# Patient Record
Sex: MALE | Race: WHITE
[De-identification: names, ages, dates, MRNs, and addresses within clinical notes are randomized per-mention and may not be internally consistent; named-entity substitution may affect disease eponyms.]

---

## 2018-02-26 ENCOUNTER — HOSPITAL ENCOUNTER (INPATIENT)
Dept: HOSPITAL 17 - NEPC | Age: 68
LOS: 2 days | Discharge: HOME | DRG: 247 | End: 2018-02-28
Attending: HOSPITALIST | Admitting: HOSPITALIST
Payer: MEDICARE

## 2018-02-26 VITALS
OXYGEN SATURATION: 97 % | RESPIRATION RATE: 18 BRPM | SYSTOLIC BLOOD PRESSURE: 124 MMHG | DIASTOLIC BLOOD PRESSURE: 73 MMHG | HEART RATE: 60 BPM

## 2018-02-26 VITALS — HEART RATE: 64 BPM

## 2018-02-26 VITALS
OXYGEN SATURATION: 97 % | HEART RATE: 63 BPM | RESPIRATION RATE: 18 BRPM | TEMPERATURE: 98 F | DIASTOLIC BLOOD PRESSURE: 81 MMHG | SYSTOLIC BLOOD PRESSURE: 117 MMHG

## 2018-02-26 VITALS — HEART RATE: 68 BPM

## 2018-02-26 VITALS
RESPIRATION RATE: 18 BRPM | TEMPERATURE: 97.8 F | SYSTOLIC BLOOD PRESSURE: 133 MMHG | DIASTOLIC BLOOD PRESSURE: 76 MMHG | OXYGEN SATURATION: 96 % | HEART RATE: 68 BPM

## 2018-02-26 VITALS — RESPIRATION RATE: 18 BRPM | TEMPERATURE: 97.8 F | OXYGEN SATURATION: 94 % | HEART RATE: 63 BPM

## 2018-02-26 VITALS — WEIGHT: 237 LBS | BODY MASS INDEX: 33.93 KG/M2 | HEIGHT: 70 IN

## 2018-02-26 VITALS — RESPIRATION RATE: 20 BRPM

## 2018-02-26 DIAGNOSIS — E66.9: ICD-10-CM

## 2018-02-26 DIAGNOSIS — Z95.5: ICD-10-CM

## 2018-02-26 DIAGNOSIS — I25.119: ICD-10-CM

## 2018-02-26 DIAGNOSIS — Z96.651: ICD-10-CM

## 2018-02-26 DIAGNOSIS — J45.909: ICD-10-CM

## 2018-02-26 DIAGNOSIS — E78.5: ICD-10-CM

## 2018-02-26 DIAGNOSIS — J42: ICD-10-CM

## 2018-02-26 DIAGNOSIS — Z96.612: ICD-10-CM

## 2018-02-26 DIAGNOSIS — I21.4: Primary | ICD-10-CM

## 2018-02-26 DIAGNOSIS — Z87.891: ICD-10-CM

## 2018-02-26 LAB
BASOPHILS # BLD AUTO: 0 TH/MM3 (ref 0–0.2)
BASOPHILS NFR BLD: 0.2 % (ref 0–2)
BUN SERPL-MCNC: 20 MG/DL (ref 7–18)
CALCIUM SERPL-MCNC: 8.5 MG/DL (ref 8.5–10.1)
CHLORIDE SERPL-SCNC: 107 MEQ/L (ref 98–107)
CREAT SERPL-MCNC: 0.97 MG/DL (ref 0.6–1.3)
EOSINOPHIL # BLD: 0 TH/MM3 (ref 0–0.4)
EOSINOPHIL NFR BLD: 0.1 % (ref 0–4)
ERYTHROCYTE [DISTWIDTH] IN BLOOD BY AUTOMATED COUNT: 12.8 % (ref 11.6–17.2)
ERYTHROCYTE [DISTWIDTH] IN BLOOD BY AUTOMATED COUNT: 12.8 % (ref 11.6–17.2)
GFR SERPLBLD BASED ON 1.73 SQ M-ARVRAT: 77 ML/MIN (ref 89–?)
GLUCOSE SERPL-MCNC: 147 MG/DL (ref 74–106)
HCO3 BLD-SCNC: 25 MEQ/L (ref 21–32)
HCT VFR BLD CALC: 39.6 % (ref 39–51)
HCT VFR BLD CALC: 40.6 % (ref 39–51)
HGB BLD-MCNC: 13.9 GM/DL (ref 13–17)
HGB BLD-MCNC: 14.3 GM/DL (ref 13–17)
INR PPP: 1 RATIO
LYMPHOCYTES # BLD AUTO: 4.2 TH/MM3 (ref 1–4.8)
LYMPHOCYTES NFR BLD AUTO: 38.9 % (ref 9–44)
MCH RBC QN AUTO: 34 PG (ref 27–34)
MCH RBC QN AUTO: 34 PG (ref 27–34)
MCHC RBC AUTO-ENTMCNC: 35.1 % (ref 32–36)
MCHC RBC AUTO-ENTMCNC: 35.1 % (ref 32–36)
MCV RBC AUTO: 96.6 FL (ref 80–100)
MCV RBC AUTO: 97 FL (ref 80–100)
MONOCYTE #: 0.7 TH/MM3 (ref 0–0.9)
MONOCYTES NFR BLD: 6.9 % (ref 0–8)
NEUTROPHILS # BLD AUTO: 5.8 TH/MM3 (ref 1.8–7.7)
NEUTROPHILS NFR BLD AUTO: 53.9 % (ref 16–70)
PLATELET # BLD: 212 TH/MM3 (ref 150–450)
PLATELET # BLD: 217 TH/MM3 (ref 150–450)
PMV BLD AUTO: 8.2 FL (ref 7–11)
PMV BLD AUTO: 8.3 FL (ref 7–11)
PROTHROMBIN TIME: 10.1 SEC (ref 9.8–11.6)
RBC # BLD AUTO: 4.08 MIL/MM3 (ref 4.5–5.9)
RBC # BLD AUTO: 4.21 MIL/MM3 (ref 4.5–5.9)
SODIUM SERPL-SCNC: 139 MEQ/L (ref 136–145)
TROPONIN I SERPL-MCNC: 0.12 NG/ML (ref 0.02–0.05)
WBC # BLD AUTO: 10.7 TH/MM3 (ref 4–11)
WBC # BLD AUTO: 10.8 TH/MM3 (ref 4–11)

## 2018-02-26 PROCEDURE — 92928 PRQ TCAT PLMT NTRAC ST 1 LES: CPT

## 2018-02-26 PROCEDURE — 85610 PROTHROMBIN TIME: CPT

## 2018-02-26 PROCEDURE — 80061 LIPID PANEL: CPT

## 2018-02-26 PROCEDURE — 99152 MOD SED SAME PHYS/QHP 5/>YRS: CPT

## 2018-02-26 PROCEDURE — C1753 CATH, INTRAVAS ULTRASOUND: HCPCS

## 2018-02-26 PROCEDURE — 85730 THROMBOPLASTIN TIME PARTIAL: CPT

## 2018-02-26 PROCEDURE — C1769 GUIDE WIRE: HCPCS

## 2018-02-26 PROCEDURE — 85007 BL SMEAR W/DIFF WBC COUNT: CPT

## 2018-02-26 PROCEDURE — C1887 CATHETER, GUIDING: HCPCS

## 2018-02-26 PROCEDURE — C1893 INTRO/SHEATH, FIXED,NON-PEEL: HCPCS

## 2018-02-26 PROCEDURE — 92978 ENDOLUMINL IVUS OCT C 1ST: CPT

## 2018-02-26 PROCEDURE — 85025 COMPLETE CBC W/AUTO DIFF WBC: CPT

## 2018-02-26 PROCEDURE — 82550 ASSAY OF CK (CPK): CPT

## 2018-02-26 PROCEDURE — C1874 STENT, COATED/COV W/DEL SYS: HCPCS

## 2018-02-26 PROCEDURE — 85027 COMPLETE CBC AUTOMATED: CPT

## 2018-02-26 PROCEDURE — 93458 L HRT ARTERY/VENTRICLE ANGIO: CPT

## 2018-02-26 PROCEDURE — 80048 BASIC METABOLIC PNL TOTAL CA: CPT

## 2018-02-26 PROCEDURE — 85002 BLEEDING TIME TEST: CPT

## 2018-02-26 PROCEDURE — 99153 MOD SED SAME PHYS/QHP EA: CPT

## 2018-02-26 PROCEDURE — 71045 X-RAY EXAM CHEST 1 VIEW: CPT

## 2018-02-26 PROCEDURE — 93005 ELECTROCARDIOGRAM TRACING: CPT

## 2018-02-26 PROCEDURE — 80053 COMPREHEN METABOLIC PANEL: CPT

## 2018-02-26 PROCEDURE — C1725 CATH, TRANSLUMIN NON-LASER: HCPCS

## 2018-02-26 PROCEDURE — 84484 ASSAY OF TROPONIN QUANT: CPT

## 2018-02-26 RX ADMIN — Medication SCH ML: at 21:14

## 2018-02-26 NOTE — PD
HPI


Chief Complaint:  Chest Pain


Time Seen by Provider:  17:28


Travel History


International Travel<30 days:  No


Contact w/Intl Traveler<30days:  No





History of Present Illness


HPI


67-year-old male with PMH of asthma presents to the ED via EMS for evaluation 

of sudden onset 10/10 substernal chest tightness.  Patient denies accompanying 

shortness of breath, nausea vomiting, diaphoresis.  He states that his pain is 

nearly resolved on presentation.  EMS reports that they administered aspirin 

and a single dose of nitroglycerin en route.  Patient states that this pain was 

different from his normal asthma symptoms.  He states that he had a similar 

pain last week while he was home in [place].  He states that he had an EKG

, 2 sets of cardiac enzymes and a negative CTA at that time.  He is not 

currently taking any medications.  He states that he was instructed to follow-

up with a cardiologist.  He endorses history of "clean" stress test "maybe 5 

years ago."  He has a 20+ pack year smoking history, quit "about 10 years ago."





[facility]


Social History


Tobacco Use:  No (former)





Allergies-Medications


(Allergen,Severity, Reaction):  


Coded Allergies:  


     No Known Allergies (Verified  Allergy, Unknown, 2/26/18)


Reported Meds & Prescriptions





Reported Meds & Active Scripts


Active


Reported


Symbicort Inh (Budesonide/Formoterol Fumarate) 80-4.5 Mcg/Act Aero 1 Puff INH 

Q12HR


Prednisone 20 Mg Tab 20 Mg PO AS DIRECTED


     40 MG twice a day x 3 days, then 20 MG daily x 3 days, then 10 MG


     daily x 3 days








Review of Systems


Except as stated in HPI:  all other systems reviewed are Neg





Physical Exam


Narrative


GENERAL: Well-nourished, well-developed obese white male in no acute distress.


SKIN: Focused skin assessment warm/dry.


HEAD: Normocephalic.


EYES: No scleral icterus. No injection or drainage. 


NECK: Supple, trachea midline. No JVD or lymphadenopathy.


CARDIOVASCULAR: Regular rate and rhythm without murmurs, gallops, or rubs. 


CHEST: Nontender throughout without deformity or crepitus.  No retractions.


RESPIRATORY: Breath sounds equal bilaterally. No accessory muscle use.


GASTROINTESTINAL: Abdomen soft, non-tender, nondistended. 


MUSCULOSKELETAL: No cyanosis, or edema.  Homans sign negative bilaterally.


BACK: Nontender without obvious deformity. No CVA tenderness.





Data


Data


Last Documented VS





Vital Signs








  Date Time  Temp Pulse Resp B/P (MAP) Pulse Ox O2 Delivery O2 Flow Rate FiO2


 


2/26/18 17:38  60 18 124/73 (90) 97 Room Air  


 


2/26/18 17:21 97.8       








Orders





 Orders


Electrocardiogram (2/26/18 17:06)


Complete Blood Count With Diff (2/26/18 17:06)


Basic Metabolic Panel (Bmp) (2/26/18 17:06)


Ckmb (Isoenzyme) Profile (2/26/18 17:06)


Troponin I (2/26/18 17:06)


Chest, Single Ap (2/26/18 17:06)


Iv Access Insert/Monitor (2/26/18 17:06)


Ecg Monitoring (2/26/18 17:06)


Oxygen Administration (2/26/18 17:06)


Oximetry (2/26/18 17:06)


Consult Cardiology (2/26/18 )


Heparin Inj (Heparin Inj) (2/26/18 19:15)


Heparin-D5w 25,000 U/250 Ml (Heparin-D5w (2/26/18 20:30)


Act Partial Throm Time (Ptt) (2/26/18 19:12)


Prothrombin Time / Inr (Pt) (2/26/18 19:12)


Cbc No Diff, Includes Plts (2/26/18 19:12)


Cbc No Diff, Includes Plts (3/1/18 06:00)


Act Partial Throm Time (Ptt) (2/27/18 02:12)


Occult Blood (Hemoccult) Stool (2/26/18 19:12)


Admit Order (Ed Use Only) (2/26/18 19:25)





Labs





Laboratory Tests








Test


  2/26/18


17:30


 


White Blood Count 10.7 TH/MM3 


 


Red Blood Count 4.21 MIL/MM3 


 


Hemoglobin 14.3 GM/DL 


 


Hematocrit 40.6 % 


 


Mean Corpuscular Volume 96.6 FL 


 


Mean Corpuscular Hemoglobin 34.0 PG 


 


Mean Corpuscular Hemoglobin


Concent 35.1 % 


 


 


Red Cell Distribution Width 12.8 % 


 


Platelet Count 217 TH/MM3 


 


Mean Platelet Volume 8.2 FL 


 


Neutrophils (%) (Auto) 53.9 % 


 


Lymphocytes (%) (Auto) 38.9 % 


 


Monocytes (%) (Auto) 6.9 % 


 


Eosinophils (%) (Auto) 0.1 % 


 


Basophils (%) (Auto) 0.2 % 


 


Neutrophils # (Auto) 5.8 TH/MM3 


 


Lymphocytes # (Auto) 4.2 TH/MM3 


 


Monocytes # (Auto) 0.7 TH/MM3 


 


Eosinophils # (Auto) 0.0 TH/MM3 


 


Basophils # (Auto) 0.0 TH/MM3 


 


CBC Comment DIFF FINAL 


 


Differential Comment  


 


Blood Urea Nitrogen 20 MG/DL 


 


Creatinine 0.97 MG/DL 


 


Random Glucose 147 MG/DL 


 


Calcium Level 8.5 MG/DL 


 


Sodium Level 139 MEQ/L 


 


Potassium Level 3.9 MEQ/L 


 


Chloride Level 107 MEQ/L 


 


Carbon Dioxide Level 25.0 MEQ/L 


 


Anion Gap 7 MEQ/L 


 


Estimat Glomerular Filtration


Rate 77 ML/MIN 


 


 


Total Creatine Kinase 80 U/L 


 


Troponin I 0.12 NG/ML 











MDM


Medical Decision Making


Medical Screen Exam Complete:  Yes


Emergency Medical Condition:  Yes


Differential Diagnosis


Chest pain versus unstable angina versus ACS versus other


Narrative Course


67-year-old male with PMH of asthma presents to the ED via EMS for evaluation 

of sudden onset 10/10 substernal chest tightness.  Patient denies accompanying 

shortness of breath, nausea vomiting, diaphoresis.    The patient states that 

he had a similar pain last week while he was home in [place].  He states 

that he had an EKG, 2 sets of cardiac enzymes and a negative CTA at that time.  

He is not currently taking any medications.  He states that he was instructed 

to follow-up with a cardiologist.  He endorses history of "clean" stress test 

"maybe 5 years ago."  He has a 20+ pack year smoking history, quit "about 10 

years ago."  EMS administered several doses of nitroglycerin and ASA en route.  

Vitals reviewed.  On exam this is an obese, nontoxic-appearing white male in no 

acute distress.  No appreciable M/R/G.  Chest CTAB.  No lower extremity edema.  

IV was established.





EKG rate 66, sinus rhythm.  IL interval 147, QRS 78,  ms.  Normal axis.  

0.05 mV ST depressions in V3 through V6.  Reviewed by Dr. Tong.


CXR: Mildly underinflated exam.  No acute pulmonary pulmonary abnormality 

identified.


Troponin: 0.12





2/26/18 17:30





Calcium Level 8.5





Heparin bolus and drip initiated.  I discussed the workup with the patient as 

well as the need for thorough cardiac evaluation and admission.  He is 

agreeable to the plan.  Cardiology consult placed.  I spoke with Dr. Medina 

who agrees to accept the patient to the medicine service.  Please see medicine 

notes for disposition.











Génesis Gomez Feb 26, 2018 17:31

## 2018-02-26 NOTE — HHI.HP
__________________________________________________





[facility]


Service


[facility]


Primary Care Physician


Unknown


Admission Diagnosis





NSTEMI


Diagnoses:  


Travel History


International Travel<30 Days:  No


Contact w/Intl Traveler <30 Da:  No


Traveled to Known Affected Are:  No


History of Present Illness


67-year-old male with past medical history significant for asthma, CAD and 

hyperlipidemia presents to the emergency department for evaluation of chest 

pain.  Per the patient, his chest pain started around 4 PM and was 10/10 

substernal and nonradiating.  He describes the pain as a pressure with 

accompanying shortness of breath.  He denies any diaphoresis.  No fever/chills.

  No nausea/vomiting/diarrhea.  The patient had similar symptoms [place] he is from a week ago where cardiac workup was negative.  He was 

instructed to follow-up with the cardiologist which she has not yet been able 

to do.  He is in [facility] on vacation.





Review of Systems


Except as stated in HPI:  all other systems reviewed are Neg





Past Family Social History


Past Medical History


Assessment


CAD


Hyperlipidemia


Past Surgical History


Stent placement 1


Right knee replacement


Left shoulder replacement


Reported Medications





Reported Meds & Active Scripts


Active


Reported


Symbicort Inh (Budesonide/Formoterol Fumarate) 80-4.5 Mcg/Act Aero 1 Puff INH 

Q12HR


Prednisone 20 Mg Tab 20 Mg PO AS DIRECTED


     40 MG twice a day x 3 days, then 20 MG daily x 3 days, then 10 MG


     daily x 3 days


Allergies:  


Coded Allergies:  


     No Known Allergies (Verified  Allergy, Unknown, 18)


Family History


Patient was adopted


Social History


Quit smoking 8 years ago.  Occasional alcohol.  Denies illicit drugs.





Physical Exam


Vital Signs





Vital Signs








  Date Time  Temp Pulse Resp B/P (MAP) Pulse Ox O2 Delivery O2 Flow Rate FiO2


 


18 21:25 98.0 63 18 117/81 (93) 97 Room Air  


 


18 21:22   20     


 


18 21:21      Room Air  


 


18 17:38  60 18 124/73 (90) 97 Room Air  


 


18 17:33  69 18  97 Room Air  


 


18 17:21 97.8 63 18  94   








Physical Exam


GENERAL:  male sitting up in bed


SKIN: No rashes, ecchymoses or lesions. Cool and dry.


HEAD: Atraumatic. Normocephalic. No temporal or scalp tenderness.


EYES: Pupils equal round and reactive. Extraocular motions intact. No scleral 

icterus. No injection or drainage. 


ENT: Nose without bleeding, purulent drainage or septal hematoma. Throat 

without erythema, tonsillar hypertrophy or exudate. Uvula midline. Airway 

patent.


NECK: Trachea midline. No JVD or lymphadenopathy. Supple, nontender, no 

meningeal signs.


CARDIOVASCULAR: Regular rate and rhythm without murmurs, gallops, or rubs. 


RESPIRATORY: Clear to auscultation. Breath sounds equal bilaterally. No wheezes

, rales, or rhonchi.  


GASTROINTESTINAL: Abdomen soft, non-tender, nondistended. No hepato-splenomegaly

, or palpable masses. No guarding.


MUSCULOSKELETAL: Extremities without clubbing, cyanosis, or edema. No joint 

tenderness, effusion, or edema noted. No calf tenderness. 


NEUROLOGICAL: Awake and alert. Cranial nerves II through XII intact.  Motor and 

sensory grossly within normal limits. Normal speech.


Laboratory





Laboratory Tests








Test


  18


17:30 18


20:20


 


White Blood Count 10.7  10.8 


 


Red Blood Count 4.21  4.08 


 


Hemoglobin 14.3  13.9 


 


Hematocrit 40.6  39.6 


 


Mean Corpuscular Volume 96.6  97.0 


 


Mean Corpuscular Hemoglobin 34.0  34.0 


 


Mean Corpuscular Hemoglobin


Concent 35.1 


  35.1 


 


 


Red Cell Distribution Width 12.8  12.8 


 


Platelet Count 217  212 


 


Mean Platelet Volume 8.2  8.3 


 


Neutrophils (%) (Auto) 53.9  


 


Lymphocytes (%) (Auto) 38.9  


 


Monocytes (%) (Auto) 6.9  


 


Eosinophils (%) (Auto) 0.1  


 


Basophils (%) (Auto) 0.2  


 


Neutrophils # (Auto) 5.8  


 


Lymphocytes # (Auto) 4.2  


 


Monocytes # (Auto) 0.7  


 


Eosinophils # (Auto) 0.0  


 


Basophils # (Auto) 0.0  


 


CBC Comment DIFF FINAL  


 


Differential Comment   


 


Blood Urea Nitrogen 20  


 


Creatinine 0.97  


 


Random Glucose 147  


 


Calcium Level 8.5  


 


Sodium Level 139  


 


Potassium Level 3.9  


 


Chloride Level 107  


 


Carbon Dioxide Level 25.0  


 


Anion Gap 7  


 


Estimat Glomerular Filtration


Rate 77 


  


 


 


Total Creatine Kinase 80  


 


Troponin I 0.12  


 


Prothrombin Time  10.1 


 


Prothromb Time International


Ratio 


  1.0 


 


 


Activated Partial


Thromboplast Time 


  23.4 


 








Result Diagram:  


18 [id number]








Caprini VTE Risk Assessment


Caprini VTE Risk Assessment:  Mod/High Risk (score >= 2)


Caprini Risk Assessment Model











 Point Value = 1          Point Value = 2  Point Value = 3  Point Value = 5


 


Age 41-60


Minor surgery


BMI > 25 kg/m2


Swollen legs


Varicose veins


Pregnancy or postpartum


History of unexplained or recurrent


   spontaneous 


Oral contraceptives or hormone


   replacement


Sepsis (< 1 month)


Serious lung disease, including


   pneumonia (< 1 month)


Abnormal pulmonary function


Acute myocardial infarction


Congestive heart failure (< 1 month)


History of inflammatory bowel disease


Medical patient at bed rest Age 61-74


Arthroscopic surgery


Major open surgery (> 45 min)


Laparoscopic surgery (> 45 min)


Malignancy


Confined to bed (> 72 hours)


Immobilizing plaster cast


Central venous access Age >= 75


History of VTE


Family history of VTE


Factor V Leiden


Prothrombin 11574F


Lupus anticoagulant


Anticardiolipin antibodies


Elevated serum homocysteine


Heparin-induced thrombocytopenia


Other congenital or acquired


   thrombophilia Stroke (< 1 month)


Elective arthroplasty


Hip, pelvis, or leg fracture


Acute spinal cord injury (< 1 month)








Prophylaxis Regimen











   Total Risk


Factor Score Risk Level Prophylaxis Regimen


 


0-1      Low Early ambulation


 


2 Moderate Order ONE of the following:


*Sequential Compression Device (SCD)


*Heparin 5000 units SQ BID


 


3-4 Higher Order ONE of the following medications:


*Heparin 5000 units SQ TID


*Enoxaparin/Lovenox 40 mg SQ daily (WT < 150 kg, CrCl > 30 mL/min)


*Enoxaparin/Lovenox 30 mg SQ daily (WT < 150 kg, CrCl > 10-29 mL/min)


*Enoxaparin/Lovenox 30 mg SQ BID (WT < 150 kg, CrCl > 30 mL/min)


AND/OR


*Sequential Compression Device (SCD)


 


5 or more Highest Order ONE of the following medications:


*Heparin 5000 units SQ TID (Preferred with Epidurals)


*Enoxaparin/Lovenox 40 mg SQ daily (WT < 150 kg, CrCl > 30 mL/min)


*Enoxaparin/Lovenox 30 mg SQ daily (WT < 150 kg, CrCl > 10-29 mL/min)


*Enoxaparin/Lovenox 30 mg SQ BID (WT < 150 kg, CrCl > 30 mL/min)


AND


*Sequential Compression Device (SCD)











Assessment and Plan


Assessment and Plan


Assessment/plan:





1.  NSTEMI


Initial troponin 0.12


EKG significant for ST depression in V4-V6, personally reviewed


Heparin drip


Serial troponins/EKGs


Cardiology consulted, appreciate recommendations





2.  Asthma/hyperlipidemia


Continue home medications





FEN


NPO


Electrolytes: Monitor and replete when necessary


Heparin drip





Physician Certification


2 Midnight Certification Type:  Admission for Inpatient Services


Order for Inpatient Services


The services are ordered in accordance with Medicare regulations or non-

Medicare payer requirements, as applicable.  In the case of services not 

specified as inpatient-only, they are appropriately provided as inpatient 

services in accordance with the 2-midnight benchmark.


Estimated LOS (days):  2


2 days is the estimated time the patient will need to remain in the hospital, 

assuming treatment plan goals are met and no additional complications.


Post-Hospital Plan:  Not yet determined











Gale Medina MD 2018 22:02

## 2018-02-26 NOTE — RADRPT
EXAM DATE/TIME:  02/26/2018 17:25 

 

[facility] COMPARISON:     

No previous studies available for comparison.

 

                     

INDICATIONS :     

Chest pain.

                     

 

MEDICAL HISTORY :     

None.          

 

SURGICAL HISTORY :     

None.   

 

ENCOUNTER:     

Initial                                        

 

ACUITY:     

1 day      

 

PAIN SCORE:     

10/10

 

LOCATION:      

middle chest.

 

FINDINGS:     

Portable AP view of the chest demonstrates a normal-sized cardiac silhouette. No effusion, consolidat
ion, or pneumothorax is visualized. The bones and soft tissues demonstrate no acute abnormality. Lung
s are underinflated.

 

CONCLUSION:     

Mildly underinflated examination. No acute cardiopulmonary abnormality is identified.

 

 

 

 Andrae Henry MD on February 26, 2018 at 17:33           

Board Certified Radiologist.

 This report was verified electronically.

## 2018-02-26 NOTE — PD
Physical Exam


Date Seen by Provider:  Feb 26, 2018


Time Seen by Provider:  18:30


Narrative


I am seeing this patient with Ana Maria Gomez PA-C.  This is a 67-year-old 

male with history coronary artery disease, chronic bronchitis, who presents 

here after having an episode of sudden onset of 10 out of 10 chest pain.  

Patient states that he was [place] from the beach when he started 

experiencing the pain.  He denies any nausea or diaphoresis.  He states he had 

similar pain when he was back in [place].  He states at that time he went 

to the hospital they did 2 sets of cardiac enzymes which were reported normal 

and a CT angiogram to rule out PE.  He reports that he did not find anything at 

that time.  The patient states that he has had previous stent placement.  He 

states he has not had a stress test in at least 2 years.





Data


Data


Last Documented VS





Vital Signs








  Date Time  Temp Pulse Resp B/P (MAP) Pulse Ox O2 Delivery O2 Flow Rate FiO2


 


2/26/18 17:38  60 18 124/73 (90) 97 Room Air  


 


2/26/18 17:21 97.8       








Orders





 Orders


Electrocardiogram (2/26/18 17:06)


Complete Blood Count With Diff (2/26/18 17:06)


Basic Metabolic Panel (Bmp) (2/26/18 17:06)


Ckmb (Isoenzyme) Profile (2/26/18 17:06)


Troponin I (2/26/18 17:06)


Chest, Single Ap (2/26/18 17:06)


Iv Access Insert/Monitor (2/26/18 17:06)


Ecg Monitoring (2/26/18 17:06)


Oxygen Administration (2/26/18 17:06)


Oximetry (2/26/18 17:06)


Consult Cardiology (2/26/18 )


Heparin Inj (Heparin Inj) (2/26/18 19:15)


Heparin-D5w 25,000 U/250 Ml (Heparin-D5w (2/26/18 19:15)


Act Partial Throm Time (Ptt) (2/26/18 19:12)


Prothrombin Time / Inr (Pt) (2/26/18 19:12)


Cbc No Diff, Includes Plts (2/26/18 19:12)


Cbc No Diff, Includes Plts (3/1/18 06:00)


Act Partial Throm Time (Ptt) (2/27/18 02:12)


Occult Blood (Hemoccult) Stool (2/26/18 19:12)


Admit Order (Ed Use Only) (2/26/18 19:25)





Labs





Laboratory Tests








Test


  2/26/18


17:30


 


White Blood Count 10.7 TH/MM3 


 


Red Blood Count 4.21 MIL/MM3 


 


Hemoglobin 14.3 GM/DL 


 


Hematocrit 40.6 % 


 


Mean Corpuscular Volume 96.6 FL 


 


Mean Corpuscular Hemoglobin 34.0 PG 


 


Mean Corpuscular Hemoglobin


Concent 35.1 % 


 


 


Red Cell Distribution Width 12.8 % 


 


Platelet Count 217 TH/MM3 


 


Mean Platelet Volume 8.2 FL 


 


Neutrophils (%) (Auto) 53.9 % 


 


Lymphocytes (%) (Auto) 38.9 % 


 


Monocytes (%) (Auto) 6.9 % 


 


Eosinophils (%) (Auto) 0.1 % 


 


Basophils (%) (Auto) 0.2 % 


 


Neutrophils # (Auto) 5.8 TH/MM3 


 


Lymphocytes # (Auto) 4.2 TH/MM3 


 


Monocytes # (Auto) 0.7 TH/MM3 


 


Eosinophils # (Auto) 0.0 TH/MM3 


 


Basophils # (Auto) 0.0 TH/MM3 


 


CBC Comment DIFF FINAL 


 


Differential Comment  


 


Blood Urea Nitrogen 20 MG/DL 


 


Creatinine 0.97 MG/DL 


 


Random Glucose 147 MG/DL 


 


Calcium Level 8.5 MG/DL 


 


Sodium Level 139 MEQ/L 


 


Potassium Level 3.9 MEQ/L 


 


Chloride Level 107 MEQ/L 


 


Carbon Dioxide Level 25.0 MEQ/L 


 


Anion Gap 7 MEQ/L 


 


Estimat Glomerular Filtration


Rate 77 ML/MIN 


 


 


Total Creatine Kinase 80 U/L 


 


Troponin I 0.12 NG/ML 











[facility]


Medical Record Reviewed:  Yes


Supervised Visit with DOMO:  Yes


Differential Diagnosis


ACS versus GERD versus pulmonary embolus versus musculoskeletal pain.


Narrative Course


67-year-old male with history of coronary artery disease, chronic bronchitis, 

presents here after having an episode of 10 out of 10 chest pain.  EKG does not 

show any acute ST elevation or depression.  Patient's cardiac enzymes are 

elevated.  The patient will be admitted to the hospital as a non-STEMI.  He was 

started on a heparin drip.  He will have an inch of nitroglycerin paste on his 

anterior chest wall.  He will be admitted to the hospitalist service.


Diagnosis





 Primary Impression:  


 Non-ST elevation MI (NSTEMI)


 Additional Impression:  


 History of coronary artery disease





Admitting Information


Admitting Physician Requests:  Admit











Alfredo Tong MD Feb 26, 2018 19:45

## 2018-02-27 VITALS — HEART RATE: 64 BPM

## 2018-02-27 VITALS
SYSTOLIC BLOOD PRESSURE: 127 MMHG | TEMPERATURE: 97.6 F | OXYGEN SATURATION: 96 % | RESPIRATION RATE: 18 BRPM | DIASTOLIC BLOOD PRESSURE: 8 MMHG | HEART RATE: 55 BPM

## 2018-02-27 VITALS
TEMPERATURE: 97.3 F | SYSTOLIC BLOOD PRESSURE: 111 MMHG | RESPIRATION RATE: 16 BRPM | OXYGEN SATURATION: 95 % | DIASTOLIC BLOOD PRESSURE: 60 MMHG | HEART RATE: 67 BPM

## 2018-02-27 VITALS
RESPIRATION RATE: 20 BRPM | DIASTOLIC BLOOD PRESSURE: 81 MMHG | TEMPERATURE: 97.7 F | OXYGEN SATURATION: 97 % | SYSTOLIC BLOOD PRESSURE: 134 MMHG | HEART RATE: 64 BPM

## 2018-02-27 VITALS — HEART RATE: 62 BPM

## 2018-02-27 VITALS
TEMPERATURE: 97.3 F | HEART RATE: 88 BPM | OXYGEN SATURATION: 97 % | SYSTOLIC BLOOD PRESSURE: 119 MMHG | RESPIRATION RATE: 19 BRPM | DIASTOLIC BLOOD PRESSURE: 77 MMHG

## 2018-02-27 VITALS
SYSTOLIC BLOOD PRESSURE: 106 MMHG | HEART RATE: 65 BPM | DIASTOLIC BLOOD PRESSURE: 59 MMHG | RESPIRATION RATE: 18 BRPM | OXYGEN SATURATION: 95 % | TEMPERATURE: 97.7 F

## 2018-02-27 VITALS — SYSTOLIC BLOOD PRESSURE: 132 MMHG | HEART RATE: 58 BPM | DIASTOLIC BLOOD PRESSURE: 75 MMHG | OXYGEN SATURATION: 96 %

## 2018-02-27 VITALS — HEART RATE: 56 BPM

## 2018-02-27 VITALS — HEART RATE: 60 BPM

## 2018-02-27 VITALS — HEART RATE: 66 BPM

## 2018-02-27 VITALS — HEART RATE: 58 BPM

## 2018-02-27 LAB
ALBUMIN SERPL-MCNC: 3.1 GM/DL (ref 3.4–5)
ALP SERPL-CCNC: 63 U/L (ref 45–117)
ALT SERPL-CCNC: 34 U/L (ref 12–78)
AST SERPL-CCNC: 19 U/L (ref 15–37)
BASOPHILS # BLD AUTO: 0.1 TH/MM3 (ref 0–0.2)
BASOPHILS NFR BLD: 0.4 % (ref 0–2)
BILIRUB SERPL-MCNC: 0.4 MG/DL (ref 0.2–1)
BUN SERPL-MCNC: 17 MG/DL (ref 7–18)
CALCIUM SERPL-MCNC: 8.3 MG/DL (ref 8.5–10.1)
CHLORIDE SERPL-SCNC: 107 MEQ/L (ref 98–107)
CREAT SERPL-MCNC: 0.79 MG/DL (ref 0.6–1.3)
EOSINOPHIL # BLD: 0.2 TH/MM3 (ref 0–0.4)
EOSINOPHIL NFR BLD: 1.5 % (ref 0–4)
ERYTHROCYTE [DISTWIDTH] IN BLOOD BY AUTOMATED COUNT: 12.6 % (ref 11.6–17.2)
GFR SERPLBLD BASED ON 1.73 SQ M-ARVRAT: 98 ML/MIN (ref 89–?)
GLUCOSE SERPL-MCNC: 89 MG/DL (ref 74–106)
HCO3 BLD-SCNC: 28.5 MEQ/L (ref 21–32)
HCT VFR BLD CALC: 39.8 % (ref 39–51)
HGB BLD-MCNC: 13.9 GM/DL (ref 13–17)
LYMPHOCYTES # BLD AUTO: 6.3 TH/MM3 (ref 1–4.8)
LYMPHOCYTES NFR BLD AUTO: 51.9 % (ref 9–44)
LYMPHOCYTES: 45 % (ref 9–44)
MCH RBC QN AUTO: 33.7 PG (ref 27–34)
MCHC RBC AUTO-ENTMCNC: 34.9 % (ref 32–36)
MCV RBC AUTO: 96.7 FL (ref 80–100)
MONOCYTE #: 0.8 TH/MM3 (ref 0–0.9)
MONOCYTES NFR BLD: 6.8 % (ref 0–8)
MONOCYTES: 4 % (ref 0–8)
NEUTROPHILS # BLD AUTO: 4.8 TH/MM3 (ref 1.8–7.7)
NEUTROPHILS NFR BLD AUTO: 39.4 % (ref 16–70)
NEUTS BAND # BLD MANUAL: 6.1 TH/MM3 (ref 1.8–7.7)
NEUTS BAND NFR BLD: 1 % (ref 0–6)
NEUTS SEG NFR BLD MANUAL: 49 % (ref 16–70)
PLATELET # BLD: 195 TH/MM3 (ref 150–450)
PMV BLD AUTO: 8 FL (ref 7–11)
PROT SERPL-MCNC: 6.2 GM/DL (ref 6.4–8.2)
RBC # BLD AUTO: 4.12 MIL/MM3 (ref 4.5–5.9)
SODIUM SERPL-SCNC: 141 MEQ/L (ref 136–145)
TROPONIN I SERPL-MCNC: 0.36 NG/ML (ref 0.02–0.05)
WBC # BLD AUTO: 12.1 TH/MM3 (ref 4–11)

## 2018-02-27 PROCEDURE — B2111ZZ FLUOROSCOPY OF MULTIPLE CORONARY ARTERIES USING LOW OSMOLAR CONTRAST: ICD-10-PCS | Performed by: INTERNAL MEDICINE

## 2018-02-27 PROCEDURE — B2151ZZ FLUOROSCOPY OF LEFT HEART USING LOW OSMOLAR CONTRAST: ICD-10-PCS | Performed by: INTERNAL MEDICINE

## 2018-02-27 PROCEDURE — 027034Z DILATION OF CORONARY ARTERY, ONE ARTERY WITH DRUG-ELUTING INTRALUMINAL DEVICE, PERCUTANEOUS APPROACH: ICD-10-PCS | Performed by: INTERNAL MEDICINE

## 2018-02-27 PROCEDURE — 4A023N7 MEASUREMENT OF CARDIAC SAMPLING AND PRESSURE, LEFT HEART, PERCUTANEOUS APPROACH: ICD-10-PCS | Performed by: INTERNAL MEDICINE

## 2018-02-27 RX ADMIN — BUDESONIDE AND FORMOTEROL FUMARATE DIHYDRATE SCH PUFF: 80; 4.5 AEROSOL RESPIRATORY (INHALATION) at 09:00

## 2018-02-27 RX ADMIN — TICAGRELOR SCH MG: 90 TABLET ORAL at 20:43

## 2018-02-27 RX ADMIN — BUDESONIDE AND FORMOTEROL FUMARATE DIHYDRATE SCH PUFF: 80; 4.5 AEROSOL RESPIRATORY (INHALATION) at 20:45

## 2018-02-27 RX ADMIN — DILTIAZEM HYDROCHLORIDE SCH MG: 30 TABLET, FILM COATED ORAL at 12:00

## 2018-02-27 RX ADMIN — Medication SCH ML: at 09:00

## 2018-02-27 RX ADMIN — DILTIAZEM HYDROCHLORIDE SCH MG: 30 TABLET, FILM COATED ORAL at 17:53

## 2018-02-27 RX ADMIN — Medication SCH ML: at 20:44

## 2018-02-27 NOTE — MB
cc:

Humza Gutiérrez MD

****

 

 

DATE OF CONSULT:

 

REASON FOR CONSULTATION:

Evaluation of chest pain.

 

HISTORY OF PRESENT ILLNESS:

Juan Daniel Ghosh is a 67-year-old man with known coronary artery 

disease.  He had a stent 3-4 years ago.  He followed with cardiologist

for one year and then nothing else after that.  He is not on statin 

therapy and he has not been on heart medications.  He started having 

chest discomfort on Saturday, intermittent episodes lasting 30 minutes

at a time, severe episode yesterday which made him come to the ER.  

His troponins are now positive.  He quit smoking 8 years ago.  He has 

hyperlipidemia but on no medications for it.  Denies hypertension or 

diabetes. Family history is unknown because he is adopted.  When asked

why he has not received cardiology followup, he seems somewhat 

nonchalant about that.  He apparently does have a regular doctor in 

[place] that he does see but he seemingly has not availed himself

of any cardiac prevention therapy with statins, etc.  Some of reported

medications are his inhaler and now prednisone.

 

PAST MEDICAL HISTORY: Includes:

   1. Coronary artery disease.

   2. Hyperlipidemia.

   3. Previous coronary stent.

   4. Right knee replacement.

   5. Left shoulder replacement.

 

SOCIAL HISTORY:

He is adopted.  He is  with a child.  Here on vacation from 

[place].  Quit smoking 8 years ago.  Occasional alcohol only.

 

ALLERGIES:

NONE KNOWN.

 

PHYSICAL EXAMINATION:

GENERAL:  Reveals an obese, pleasant white male, in no acute distress.

 

VITAL SIGNS:  Charted.

HEENT:  Unremarkable.

NECK:  There is no JVD.  There are no bruits.

CHEST:  Shows some mildly reduced breath sounds and I hear a few 

scattered expiratory wheezes.

CARDIAC:  S1, S2.  Regular rate and rhythm.  No murmurs or gallops.   

 

ABDOMEN:  Soft, nontender.

EXTREMITIES:  No clubbing, cyanosis or edema.  His pulses are intact. 

 

LABORATORY DATA:

His creatinine is 0.79.  Troponins come from 0.12 up to a peak of 0.36

this morning.

 

IMAGING STUDIES:

His chest x-ray shows no acute disease.

 

IMPRESSION:

A 67-year-old male with known coronary artery disease, who just was 

stented 3-4 years ago and has not had a cardiology followup, now 

presents with a new acute non-ST segment elevation myocardial 

infarction.

 

PLAN:

The patient has been started on aspirin, nitrates and heparin.  I am 

going to go ahead with a cardiac catheterization this morning.  

Informed consent has been obtained. No beta-blocker currently because 

of the asthma, may consider diltiazem after his cath.  Further therapy

to be determined.

 

 

 

__________________________________

MD GHAZALA Pelaez/TL/rr

D: 02/27/2018, 07:45 AM

T: 02/27/2018, 09:45 AM

Visit #: [id number]

Job #: [id number]

## 2018-02-27 NOTE — EKG
Date Performed: 02/26/2018       Time Performed: 23:29:54

 

PTAGE:      67 years

 

EKG:      Sinus rhythm 

 

 Inferior/lateral ST-T changes are nonspecific Low QRS voltages in limb leads Borderline ECG Since th
e prior tracing, there has been no significant change

 

DOCTOR:   Neil Valadez  Interpretating Date/Time  02/27/2018 22:15:19

## 2018-02-27 NOTE — MA
cc:

Humza Gutiérrez MD

****

 

 

02/27/2018

 

PROCEDURE PERFORMED:

Left heart catheterization, left ventriculography, coronary 

angiography, balloon angioplasty, stenting and intravascular 

ultrasound of the proximal left circumflex coronary artery.

 

BRIEF HISTORY:

Juan Daniel Ghosh is a 67-year-old man who had a stent a few years ago.  

He has not been followed by cardiology.  He has not been on statin 

even though there is a suggestion of a hyperlipidemia.  He comes in 

with intermittent chest pain since Saturday and elevated troponin and 

ST changes consistent with an acute non-ST segment elevation 

myocardial infarction.

 

DESCRIPTION OF THE PROCEDURE:

The patient was brought to the cardiac cath lab in a fasting state 

with a heparin drip running.  The right wrist was prepped and draped 

in a sterile fashion.  Using a Jelco needle, a Terumo sheath was 

placed without difficulty.  Coronary angiography was then performed 

using a Tiger catheter.  The circumflex artery had critical disease.  

I exchanged over a wire to an XB 3.5 left guiding catheter.  I was 

able to engage the left main nicely.  The circumflex artery was then 

crossed with an ATW wire and an angiogram demonstrated the proximal 

circumflex lesion was long.  I placed the wire distally into the 

enlarged posterolateral branch.  I then did a balloon angioplasty of 

the critical area in the circumflex which was just before the marginal

branch.  I then took an angiogram.  I gave consideration to placing a 

new stent end-to-end with the old stent all the way up to the proximal

vessel versus leaving a gap between the stents.  I chose to go ahead 

and overlap meaning I jailed the major marginal branch.  I chose a 3.5

x 38 mm Resolute Nick stent and deployed this at 12 atmospheres.  

There was good symmetrical expansion of the stent.  The stent balloon 

was then withdrawn.  Angiography demonstrated the proximal vessel 

needed post-dilation.  I did IVUS which confirmed to me it was 

formidable vessel.  I then used a 4.0 x 20 mm noncompliant balloon 

proximally inside the stent and inflated the stent to 20 atmospheres 

which was a 4.24 mm diameter.  Angiography now demonstrates an 

excellent result in the circumflex proper.  Intracoronary  

nitroglycerin was given as well.  There was some narrowing of the 

major obtuse marginal branch, but not severe enough to require 

revascularization.  The catheter was then exchanged over a wire to an 

angled pigtail catheter whereupon left ventriculography and then a 

pullback were performed.

 

At the end of the procedure, the sheath is being removed from the 

right wrist with a Terumo band placed.  The patient is being loaded 

with Brilinta and being transferred out of the cath lab in stable 

condition.

 

FINDINGS:

 

HEMODYNAMICS: Left  ventricular pressure was 119/0 with an end 

diastolic pressure of 33.  Aortic pressure is 124/71 with a mean of 

94.  There was no gradient during pullback from the left ventricle to 

the aorta.

 

LEFT VENTRICULOGRAPHY:  Left ventriculography in the GOODEN projection 

appears normal with an estimated EF of 50%.

 

CORONARY ANGIOGRAPHY:

   1.  Left main coronary artery is short and normal-appearing.  It 

      bifurcates into a large LAD and large circumflex vessel.

   2. The LAD gives off the first diagonal branch and a few 

      millimeters after that the first septal  branch and 

      then continuation of the LAD.  The LAD between the septal and 

      diagonal has about 40% stenosis.  The diagonal branch has about 

      a 70% mid stenosis where it bifurcates.  The remainder of the 

      LAD has irregularities only.

   3. The circumflex artery has a long stenosis beginning proximally 

      with a 99% stenosis just before the major obtuse marginal branch

      and before the previous stent that was in the mid circumflex 

      vessel.

   4. The posterolateral branch has about 30% disease and is a large 

      vessel.

   5. The obtuse marginal branch is medium sized with 50% ostial and 

      proximal disease.

   6. The right coronary artery is dominant with 50%-60% proximal and 

      mid disease.

 

RESULTS OF STENTING:

Finally stenting of the circumflex artery proper, a 0% residual 

stenosis had been achieved throughout the circumflex.  The major 

obtuse marginal branch has at least 50% ostial disease and proximal 

disease, but was not intervened upon.

 

PLAN:

The patient is loaded with Brilinta and will continue this for 30 days

with a free coupon and hopefully continue this medication long-term.  

If Brilinta needs to be interrupted or switched to Plavix, I would 

recommend giving a 600 mg Plavix dose 24 hours after the last dose of 

Brilinta, but I would prefer he stay on Brilinta for the next year.   

The patient will be watched today.  High likelihood of possible 

discharge tomorrow.

 

 

 

 

__________________________________

MD GHAZALA Pelaez/TEOFILO/

D: 02/27/2018, 09:32 AM

T: 02/27/2018, 11:31 AM

Visit #: [id number]

Job #: [id number]

## 2018-02-27 NOTE — CATHPROC
Bureaux A Partager HIS Report

Study Information

Study Number    Admission           Scheduled Start             Study Start

 

[id number]    Feb 26 2018 7:27PM      02/27/2018 Feb 27 2018 7:53AM

 

Universal Service

 

Cardiac Catheterization

 

Admit Source               Facility Department

 

Emergency department           [facility] [address] - Cath Lab

 

Physician and Clinical Staff

Initial Humza Gr           Circulator     Gale Jackson,RN

 

                         Circulator     Reyna English,RN

 

                         Recorder      Mary Dominguez,RCIS TECH2

 

                         Scrub        Hostedison, Jose,RT(R)

 

Procedures Performed

Procedure                     Location (Site)           Vessel Name

 

Angiogram LV                   LV                 Ventricle

Coronary Angiograms                 LCA                Left Coronary

Coronary Angiograms                 RCA                Right Coronary

Drug Eluting Inflatio               CIRC Prox              CIRC

IVUS                        LCA                Left Coronary

PTCA                       CIRC Prox              CIRC

Wire insertion                  Radial (right)           Radial Art.

Equipment

Time            Description           Size       Mfg Part Number  Used/Scraped

                   TRANSDUCER, TRUWAVE               [id number]

07:55    LA VALVERDE                      *                 Used

                   W/STOCKCOCK                   *[id number]

                                            [phone]



                                            *[id number]

                                            595-



                                            *[id number]

                                            [phone]



                                            *[phone]

                                            [id number]

07:55    [employer]       SYRINGE, ANGIOMAT 150ML     150ML      *[id number] Used

                                            2SUB

                                            [id number]

07:55    [employer]    PACK, CCL CUSTOM        *                 Used

                                            *[id number]

07:55    [employer]    SUPPORT, ARTERIAL ADULT             [id number] *[id number] Used

                                            [id number]

07:55    [employer] PACER      PEN, SKIN DUAL W/ RULER     *                 Used

                                            *[id number]

                                            [id number]

08:44    [employer]        BALLOON, 2.5 X 20MM EUPHORA 20MM                  Used

                                            *[id number]

                   BALLOON, 4.0 X 20MM NC              [id number]

09:01    [employer]                        20MM                Used

                   EUPHORA                     *[id number]

08:47    [employer]        STENT, 3.5 38MM FLORIAN      3.5 38MM     [id number]   Used

                                            [id number]

08:45    [employer]      30 YOHANA INDEFLATOR                         Used

                                            *[id number]

                   BAND, RADIAL COMPRESSION TR           [id number]

09:10    [employer]                      29CM                Used

                   LARGE 29                     *[id number]

                                            [id number]

07:55    [employer]      WIRE, EXCHANGE 260CM 3MMJ    260CM               Used

                                            *[id number]

                                            [id number]

07:55    NAMIC          MANIFOLD, 4 PORT        *                 Used

                                            *[id number]

                   TUBING, PRESSURE INJECTION            [id number]

09:07    NAMIC PACER                       72"                Used

                   72"                       *[id number]

07:55    [employer]         OMNIPAQUE, 350 MG, 100ML    100ML      [id number]      Used

 

09:08    [employer]         OMNIPAQUE, 350 MG, 50ML     50ML       [id number]      Used

                                            [id number]

07:55    [employer]      BLANKET,WARM AIR CCL      *                 Used

                                            *[id number]

07:55    [employer] software      JELCO NEEDLE                   [id number] *[id number]   Used

                   CATHETER, FR5 OPTITORQUE             

08:10    [employer]                      FR 5                Used

                   RADIAL TIG 4.0                  *[id number]

                   SHEATH, FR6 TRANSRADIAL             [id number]

07:55    [employer]                      FR 6                Used

                   SLENDER 10CM                   *[id number]

                   CATHETER, EAGLE EYE PLATINUM           [id number]

08:55    [employer]                                           Used

                   IMAGING                     *[id number]

 

Equipment Model, Serial, Lot Number and Expiration Data

Description              Model Number      Serial Number   Lot Number       Expiration Date

CATHETER, EAGLE EYE PLATINUM

                               [id number]              10-

IMAGING

STENT, 3.5 38MM FLORIAN         [id number]                [id number]       11-

 

History: Current Medications

Medication         Dosage/Unit       Route      Frequency  Last Date/Time Taken

 

PREDNISONE

History: Allergies

Allergy                Reaction

 

No Known Allergies

 

 

History: Risk Factors

                       Family History of

Hypertension     Dyslipidemia                   Previous MI    Previous Heart Failure

                       Premature CAD

No          Yes           No            Yes        No

Prior Valve

           Prior PCI        Prior PCIDate       Prior CABG

Surgery

No          Yes           01/01/2014        No

           Cerebrovascular     Peripheral Artery     Chronic Lung

On Dialysis                                        Diabetes

           Disease         Disease          Disease

No          No           No            No         No

 

 

History: Symptoms/Diagnosis Selection Items

Chest pain

 

SOB

 

 

History: Stress Tests

Stress or Imaging Studies Performed

 

No

 

 

History: Other

Current Smoker      Method      Quit            Packs a Day       Years Used     Pack Years

 

No            Cigarettes    8 Years Ago         1            20         20

 

Labs

Hgb (g/dl)        Hct (%)         WBC (l/cumm)        Platelets (thousands)

 

11.60-17.00        35.00-51.00       4.00-11.00         150..00

 

13.9           39.8          12.1            195

 

Glucose (mg/dl)      BUN (mg/dl)       Creatinine (mg/dl)    BUN:Creatinine (1:x)

74..00       7.00-18.00       0.50-1.30         10.00-20.00

 

89            17           0.7            24.3

 

Na (meq/l)        K (meq/l)

 

136..00       3.50-5.10

 

141            3.7

 

INR (PTT:PT)

 

0.90-1.10

 

1

 

Troponin I (ng/ml)    CPK (u/l)        CPK-MB (ng/ML)

 

0.02-0.05         26..00      0.50-3.60

 

0.36           80           Not Drawn

Medication

Medication Total Dose (Bolus/Oral)

Medication              Total Dosage/Unit

 

1% XYLOCAINE                20 mL

 

BRILINTA                  180 mg

 

FENTANYL                  100 mcg

 

HEPARIN                 4000 units

 

NTG (IC)                  150 mcg

 

OXYGEN                    2 l/min

 

RADIAL COCKTAIL               5 mL (Bolus)

 

VERSED                    3 mg

 

Medications (Bolus/Oral)

Medication          Time Given           Dosage/Unit       Administered By       Reason

 

VERSED            2/27/2018 8:15:57 AM       2 mg         Gale Jackson

2 mg VERSED given in lab by Gale Jackson RN in Right Hand via Peripheral IV. Ordered by VIDA Gutiérrez.

 

1% XYLOCAINE         2/27/2018 8:17:02 AM      20 mL         Humza Gutiérrez

20 mL 1% XYLOCAINE given in lab by Humza Gutiérrez in Right Radial via Subcutaneous. Ordered by Humza Gutiérrez.

 

FENTANYL           2/27/2018 8:17:19 AM      50 mcg         Gale Jackson

50 mcg FENTANYL given in lab by Gale Jackson, RN in Right Hand via Peripheral IV. Ordered by Humza Spencer.

 

                                                         Ntg 200mcg Verapamil 2.5mg Heparin

RADIAL COCKTAIL        2/27/2018 8:21:22 AM       5 mL (Bolus)     Humza Gutiérrez

                                                         2500U

5 mL (Bolus) RADIAL COCKTAIL given in lab by Humza Gutiérrez in Right Radial via Radial. Using [Solutio
n Name]. Ordered by Humza Gutiérrez.

Reason: Ntg 200mcg Verapamil 2.5mg Heparin 2500U.

VERSED            2/27/2018 8:24:26 AM       1 mg         Gale Jackson

1 mg VERSED given in lab by Gale Jackson RN in Right Hand via Peripheral IV. Ordered by VIDA Gutiérrez.

 

HEPARIN            2/27/2018 8:31:21 AM     4000 units        Gale Jackson

4000 units HEPARIN given in lab by Gale Jackson, RN in Right Hand via Peripheral IV. Ordered by Humza Wheeler.

 

OXYGEN            2/27/2018 8:34:55 AM       2 l/min        Gale Jackson

2 l/min OXYGEN given in lab by Gale Jackson, RN via Nasal. Ordered by Humza Gutiérrez.

 

NTG (IC)           2/27/2018 8:53:28 AM      150 mcg         Humza Gutiérrez

150 mcg NTG (IC) given in lab by Humza Gutiérrez via Intra-coronary to LCA. Ordered by Humza Gutiérrez.

 

FENTANYL           2/27/2018 9:00:31 AM      50 mcg         Gale Jackson

50 mcg FENTANYL given in lab by Gale Jackson, RN in Right Hand via Peripheral IV. Ordered by Humza Spencer.

 

BRILINTA           2/27/2018 9:15:27 AM      180 mg         Gale Jackson

180 mg BRILINTA given in lab by Gale Jackson, RN via Oral. Ordered by Humza Gutiérrez.

 

Medication (Drip)

Medication          Time Given           Dosage/Unit      Concentration/Unit   Diluent (ml)  Solution


 

IV Solutions         2/27/2018 7:55:38 AM       0 mL (IV)                  500      NaCl .9

Patient arrived on IV Solutions in Right Hand via Peripheral IV. Pump/Drip Flow = 20 ml/hr using NaCl
 .9.

Initial Case Assessment

Cardiovascular

HR          Rhythm         NIBP          Chest Pain

 

58          sr           126/89         0

 

Circulatory - Right Pulses

Dorsalis Pedis    Femoral         Radial

 

2           2            2

 

Scale (0,1,2,3,4,d)

 

Circulatory - Left Pulses

Dorsalis Pedis    Femoral         Radial

 

2           2

 

Scale (0,1,2,3,4,d)

 

Neurological State

           Oriented to time-place-

Alert                     Moves all extremities

           person

 

Respiration - General

Respiration Rate

           SpO2 (%)

(B/min)

16          97

 

Final Case Assessment

Cardiovascular

HR          Rhythm         NIBP          Chest Pain

 

65          sr           128/84         0

 

Circulatory - Right Pulses

Dorsalis Pedis    Femoral         Radial

 

2           2            2

 

Scale (0,1,2,3,4,d)

 

Circulatory - Left Pulses

Dorsalis Pedis    Femoral         Radial

 

2           2

 

Scale (0,1,2,3,4,d)

 

Neurological State

           Oriented to time-place-

Alert                     Moves all extremities

           person

 

Respiration - General

Respiration Rate

           SpO2 (%)        O2 (lpm)

(B/min)

16          99           2

 

Chronological Log

Time   Study Chronological Log

 

7:50:58  Patient arrived via Bed.

 

7:50:59  Patient Name, D.O.B, / Armband Verified By R.N.

     Vitals capture started with the following parameters, Patient=Adult, Interval=5 min, Initial Pre
oytwr=407 mmHg,

7:53:24

     Deflation Rate=5 mmHg, Cuff placed on Right Ankle

7:54:07  VMJP=308/95 mmhg, SpO2=95.0 %

 

7:55:18  Consent signed by the physician and the patient and verified by the Cath Lab staff.

 

7:55:19  Pre-op and post- op instructions given; patient acknowledges understanding of instructions.

 

7:55:19  Verbal Stimulation=2 Physical Stimulation=2 Airway=2 Respiration=2 TOTAL=8. (0=absent, 1=fontaine
ited, 2=present)

 

7:55:31  Presedation assessment performed by Cath Lab RN.

 

7:55:33  Allens test performed on the right radial and ulnar artery.

 

7:55:35  Patient has been NPO for More than 6Hrs.

 

7:55:35  Skin Breakdown-none

 

7:55:37  Rafael Prominences Protected

 

7:55:37  A # 20 IV was noted in the Hand (right). Grade = patent

 

7:55:38  Patient arrived on IV Solutions in Right Hand via Peripheral IV. Pump/Drip Flow = 20 ml/hr u
sing NaCl .9.

 

7:55:39  A # 20 IV was noted in the Hand (left). Grade = not patent

 

7:55:40  History and physical on the chart or being dictated.

 

7:59:00  HR=58 bpm, BYWG=935/89 mmhg, SpO2=95.0 %, Resp=18 B/min, Pain=0, Celine=10, Pedro=2

     Assessment: Initial Case, HR=58 BPM, Rhythm=sr, ICBD=095/89 mmhg, Chest Pain=0

     Right Pulses: Loy Ped=2, Femoral=2, Radial=2

8:00:13  Left Pulses: Loy Ped=2, Femoral=2

     Neurological: State=Alert, Ox3, THURMAN

     Respiration: Resp=16 B/min, SpO2=97 %

8:03:37  Reference ECG taken

 

8:03:57  HR=59 bpm, XPJK=796/91 mmhg, SpO2=98 %, Resp=14 B/min

 

8:09:00  XT=732 bpm, MTGF=412/92 mmhg, SpO2=97.0 %, Resp=19 B/min

 

8:09:23  Right groin and right radial prepped with 2% chlorhexidine, and draped after a 3 min. waitin
g time.

 

8:09:57  MD arrived.

 

8:13:07  Pressure channel 1 zeroed.

 

8:14:01  HR=58 bpm, NWYY=427/82 mmhg, SpO2=96.0 %, Resp=16 B/min

     Time Out. Correct patient, correct procedure, correct physician, power injector loaded with cont
rast with surgical team

8:15:32

     present. Time Out Concurred by MD and individual staff in procedure.

8:15:57  2 mg VERSED given in lab by Gale Jackson, RN in Right Hand via Peripheral IV. Ordered by 
Humza Gutiérrez.

 

8:17:02  Case Start

 

8:17:02  20 mL 1% XYLOCAINE given in lab by Humza Gutiérrez in Right Radial via Subcutaneous. Ordered b
y Humza Gutiérrez.

 

8:17:19  50 mcg FENTANYL given in lab by Gale Jackson, RN in Right Hand via Peripheral IV. Ordered
 by Humza Gutiérrez.

 

8:19:00  HR=61 bpm, FUMF=351/80 mmhg, SpO2=96.0 %, Resp=17 B/min

 

8:20:48  Access site was Radial Artery. Right

     A SHEATH, FR6 TRANSRADIAL SLENDER 10CM FR 6 was advanced into the Radial (right) using the Rd scanlon

8:21:02

     technique.

     5 mL (Bolus) RADIAL COCKTAIL given in lab by Humza Gutiérrez in Right Radial via Radial. Using [So
lution Name].

8:21:22

     Ordered by Humza Gutiérrez. Reason: Ntg 200mcg Verapamil 2.5mg Heparin 2500U.

     A CATHETER, FR5 OPTITORQUE RADIAL TIG 4.0 FR 5 was advanced over a wire. OMNIPAQUE, 350 MG, 100M
L 100ML

8:23:17

     was used for injections.

8:24:04  HR=59 bpm, HWUN=265/64 mmhg, SpO2=94.0 %, Resp=18 B/min

 

8:24:26  1 mg VERSED given in lab by Gale Jackson, RN in Right Hand via Peripheral IV. Ordered by 
Humza Gutiérrez.

     Recorded Pressure: Ao, HR=59, Condition=Condition 1

8:25:44

     (Aorta) Ao 97/64/78

8:25:56  The  LCA was injected and visualized at various angles. OMNIPAQUE, 350 MG, 100ML 100ML used.


 

8:28:58  HR=62 bpm, CLLZ=100/75 mmhg, SpO2=94.0 %, Resp=19 B/min

 

8:29:01  The  RCA was injected and visualized at various angles. OMNIPAQUE, 350 MG, 100ML 100ML used.


     After removing the current catheter a XB 3.5 GUIDE CATHETER FR 6 was advanced over a WIRE, EXCHA
NGE 260CM

8:30:34

     3MMJ 260CM.

8:31:21  4000 units HEPARIN given in lab by Gale Jackson, RN in Right Hand via Peripheral IV. Orde
red by Humza Gutiérrez.

 

8:34:00  HR=61 bpm, AMXM=002/81 mmhg, SpO2=95.0 %, Resp=15 B/min

 

8:34:55  2 l/min OXYGEN given in lab by Gale Jackson, RN via Nasal. Ordered by Humza Gutiérrez.

 

8:38:49  The  LCA was injected and visualized at various angles. OMNIPAQUE, 350 MG, 100ML 100ML used.


 

8:39:03  HR=64 bpm, QZPJ=388/73 mmhg, SpO2=98.0 %, Resp=18 B/min, Celine=10

 

8:39:33  A WIRE, ATW MARKER 195CM 195CM was inserted via Radial (right).

 

8:42:51  Activated Clotting Time Drawn

 

8:43:23  A BALLOON, 2.5 X 20MM EUPHORA 20MM was inserted over WIRE, ATW MARKER 195CM 195CM via the CI
RC Prox.

 

8:44:04  HR=60 bpm, MDBG=374/81 mmhg, SpO2=97.0 %, Resp=22 B/min

     A BALLOON, 2.5 X 20MM EUPHORA 20MM over a WIRE, ATW MARKER 195CM 195CM in the CIRC Prox was infl
ated

8:45:13

     using a 30 YOHANA INDEFLATOR at 12 yohana for 20 sec.

8:46:22  Balloon Removed.

     A STENT, 3.5 38MM FLORIAN 3.5 38MM was advanced through a XB 3.5 GUIDE CATHETER FR 6 over a WIRE, A
TW

8:47:37

     MARKER 195CM 195CM.

8:49:01  HR=55 bpm, IWFN=630/96 mmhg, SpO2=99 %, Resp=16 B/min

     A STENT, 3.5 38MM FLORIAN 3.5 38MM was deployed using a 30 YOHANA INDEFLATOR at 12 atmospheres for 30 
seconds in

8:50:11

     the CIRC Prox/Mid.

8:51:25  Delivery device removed

 

8:51:34  ACT (Normal Range ) = 492

 

8:53:28  150 mcg NTG (IC) given in lab by Humza Gutiérrez via Intra-coronary to LCA. Ordered by Humza Gutiérrez.

 

8:54:04  HR=63 bpm, CXTF=244/83 mmhg, SpO2=98.0 %, Resp=23 B/min

 

8:56:17  An CATHETER, EAGLE EYE PLATINUM IMAGING was advanced through the lesion.     Images saved on
to IVUS hard drive

 

8:58:49  Ivus of Prox Circ in progress

 

8:59:07  HR=60 bpm, WPVE=619/78 mmhg, SpO2=97.0 %, Resp=12 B/min, Celine=10, Pedro=2

 

8:59:26  Patient complaining of chest pain

 

9:00:31  50 mcg FENTANYL given in lab by Gale Jackson, RN in Right Hand via Peripheral IV. Ordered
 by Humza Gutiérrez.

 

9:00:38  Ivus Catheter was removed

 

9:00:53  A BALLOON, 4.0 X 20MM NC EUPHORA 20MM was inserted over WIRE, ATW MARKER 195CM 195CM via the
 CIRC Prox.

     A BALLOON, 4.0 X 20MM NC EUPHORA 20MM over a WIRE, ATW MARKER 195CM 195CM in the CIRC Prox was i
nflated

9:01:27

     using a 30 YOHANA INDEFLATOR at 20 yohana for 20 sec.

9:02:48  Balloon Removed.

 

9:03:47  Patient voiced chest pain subsided/improving

 

9:03:53  Wire removed

     After removing the current catheter a PIGTAIL ANG. INFINITI CATHETER FR 5 was advanced over a WI
RE, EXCHANGE

9:03:59

     260CM 3MMJ 260CM.

9:04:08    HR=60 bpm, WEHK=148/68 mmhg, SpO2=98.0 %, Resp=15 B/min

       Recorded Pressure: LV, HR=60, Condition=Condition 1

9:07:10

       (Left Ventricle) /7/24

9:08:05    The LV was injected at 8 cc/sec for a total of 32. OMNIPAQUE, 350 MG, 50ML 50ML used.

       Recorded Pressure: LV, Ao, HR=59, Condition=Condition 1

9:08:40    (Left Ventricle) /13/21,

       (Aorta) Ao 121/78/92

9:09:03    HR=59 bpm, RQWF=032/84 mmhg, SpO2=98.0 %, Resp=16 B/min, Celine=10

 

9:09:25    Catheter was removed over wire.

 

9:09:35    Case End

       Radial Compression Device Used. 11 mLs of air placed in BAND, RADIAL COMPRESSION TR LARGE 29 2
9CM. Affected

9:10:29

       hand 98 % O2 saturation.

9:10:52    No case complications noted.

 

9:10:53    Cine recording checked.

 

9:10:55    Implantable Device card placed in patient's chart.

 

9:10:57    Bedside Report will be given.

       Assessment: Final Case, HR=65 BPM, Rhythm=sr, DWGS=473/84 mmhg, Chest Pain=0

       Right Pulses: Loy Ped=2, Femoral=2, Radial=2

9:11:05    Left Pulses: Loy Ped=2, Femoral=2

       Neurological: State=Alert, Ox3, THURMAN

       Respiration: Resp=16 B/min, SpO2=99 %, O2=2 lpm

9:14:04    HR=64 bpm, PWBM=536/95 mmhg, SpO2=98 %, Resp=15 B/min, Celine=1

 

9:15:27    180 mg BRILINTA given in lab by Gale Jackson RN via Oral. Ordered by Humza Gutiérrez.

 

9:18:54    Vitals capture stopped.

 

9:20:28    Patient moved to bed

 

9:22:30    Patient transported to DOCU.

 

 

 

 

End Study - Contrast Media Used In Study

Contrast        Total Opened (mL)    Total Used (mL)      Total Wasted (mL)

 

Omnipaque       120           120            0

 

End Study - Maximum Contrast Load

Max Contrast Load (mL)

 

770.1

 

End Study - Radiation Exposure

Fluoro Time

(minutes)

13.1

 

End Study - Sheaths

Sheaths Pulled By               Sheath Hold Time (min)

 

Jose Andrade

End Study - Patient Disposition

Complications  Transferred To  Interventional Outcome

 

No       Telemetry Bed   successful

## 2018-02-27 NOTE — HHI.PR
Subjective


Remarks


Follow-up for NSTEMI.  Patient underwent cardiac catheterization today.  He is 

currently doing well.  No chest pain, shortness of breath, fever or chills.  He 

would like to get breathing treatments.





Objective


Vitals





Vital Signs








  Date Time  Temp Pulse Resp B/P (MAP) Pulse Ox O2 Delivery O2 Flow Rate FiO2


 


2/27/18 09:42     95 Room Air  


 


2/27/18 08:00 97.7 64 20 134/81 (98) 97   


 


2/27/18 08:00  57      


 


2/27/18 04:03  62      


 


2/27/18 04:00 97.7 65 18 106/59 (75) 95   


 


2/27/18 04:00      Room Air  


 


2/27/18 00:00 97.3 67 16 111/60 (77) 95   


 


2/27/18 00:00      Room Air  


 


2/26/18 23:50  68      


 


2/26/18 22:29  64      


 


2/26/18 22:05 97.8 68 18 133/76 (95) 96   


 


2/26/18 21:25 98.0 63 18 117/81 (93) 97 Room Air  


 


2/26/18 21:22   20     


 


2/26/18 21:21      Room Air  


 


2/26/18 17:38  60 18 124/73 (90) 97 Room Air  


 


2/26/18 17:33  69 18  97 Room Air  


 


2/26/18 17:21 97.8 63 18  94   














I/O      


 


 2/26/18 2/26/18 2/26/18 2/27/18 2/27/18 2/27/18





 07:00 15:00 23:00 07:00 15:00 23:00


 


Intake Total    95 ml  


 


Balance    95 ml  


 


      


 


Intake IV Total    95 ml  


 


# Voids    1  








Result Diagram:  


2/27/18 0600                                                                   

             2/27/18 0600





Imaging





Last Impressions








Chest X-Ray 2/26/18 1706 Signed





Impressions: 





 Service Date/Time:  Monday, February 26, 2018 17:25 - CONCLUSION:  Mildly 





 underinflated examination. No acute cardiopulmonary abnormality is identified.

   





   Andrae Henry MD 








Objective Remarks


GENERAL: Alert, oriented 3, NAD.


SKIN: Warm and dry.


HEAD: Normocephalic.


EYES: No scleral icterus. No injection or drainage. 


NECK: Supple, trachea midline. No JVD or lymphadenopathy.


CARDIOVASCULAR: Regular rate and rhythm without murmurs, gallops, or rubs. 


RESPIRATORY: Breath sounds equal bilaterally. No accessory muscle use.


GASTROINTESTINAL: Abdomen soft, non-tender, nondistended. 


MUSCULOSKELETAL: No cyanosis, or edema. 


BACK: Nontender without obvious deformity. No CVA tenderness.





Procedures


Cardiac catheterization 2/27/2018.





CORONARY ANGIOGRAPHY:


   1.  Left main coronary artery is short and normal-appearing.  It 


      bifurcates into a large LAD and large circumflex vessel.


   2. The LAD gives off the first diagonal branch and a few 


      millimeters after that the first septal  branch and 


      then continuation of the LAD.  The LAD between the septal and 


      diagonal has about 40% stenosis.  The diagonal branch has about 


      a 70% mid stenosis where it bifurcates.  The remainder of the 


      LAD has irregularities only.


   3. The circumflex artery has a long stenosis beginning proximally 


      with a 99% stenosis just before the major obtuse marginal branch


      and before the previous stent that was in the mid circumflex 


      vessel.


   4. The posterolateral branch has about 30% disease and is a large 


      vessel.


   5. The obtuse marginal branch is medium sized with 50% ostial and 


      proximal disease.


   6. The right coronary artery is dominant with 50%-60% proximal and 


      mid disease.


 


RESULTS OF STENTING:


Finally stenting of the circumflex artery proper, a 0% residual 


stenosis had been achieved throughout the circumflex.  The major 


obtuse marginal branch has at least 50% ostial disease and proximal 


disease, but was not intervened upon.


 


PLAN:


The patient is loaded with Brilinta and will continue this for 30 days


with a free coupon and hopefully continue this medication long-term.  


If Brilinta needs to be interrupted or switched to Plavix, I would 


recommend giving a 600 mg Plavix dose 24 hours after the last dose of 


Brilinta, but I would prefer he stay on Brilinta for the next year.





A/P


Problem List:  


(1) Non-ST elevation MI (NSTEMI)


ICD Code:  I21.4 - Non-ST elevation (NSTEMI) myocardial infarction


Status:  Acute


Assessment and Plan


Mr. Ghosh is a 67-year-old  male with a history of coronary artery 

disease who presented to the emergency department on 2/26/2018 with substernal 

chest pain.  Patient was found to have non-STEMI.





- Non-STEMI


   -Patient underwent cardiac catheterization today.  Stent placed to the 

circumflex artery.


   -Continue aspirin 81 mg daily, regular 90 mg twice daily.


   -Continue atorvastatin 40 mg daily


   -Continue Imdur 60 mg daily and diltiazem 30 mg every 6 hours.





-Asthma


   -Patient takes Symbicort at home.  Will continue Symbicort.


   -Start DuoNeb every 4 hours as needed.





Full code.  SCDs.  Telemetry.





Discharge plan: Likely discharge on 2/28/2018.











Roby Farias DO Feb 27, 2018 12:24 pm

## 2018-02-27 NOTE — EKG
Date Performed: 02/27/2018       Time Performed: 05:09:48

 

PTAGE:      67 years

 

EKG:      Sinus rhythm 

 

 Inferior/lateral ST-T changes are nonspecific Low QRS voltages in limb leads Borderline ECG Since th
e prior tracing, there has been no significant change

 

DOCTOR:   Neil Valadez  Interpretating Date/Time  02/27/2018 22:06:17

## 2018-02-27 NOTE — EKG
Date Performed: 02/26/2018       Time Performed: 17:12:31

 

PTAGE:      67 years

 

EKG:      Sinus rhythm 

 

 LOW QRS VOLTAGE IN EXTREMITY LEADS MODERATE ST DEPRESSION ABNORMAL ECG 

 

NO PREVIOUS TRACING            

 

DOCTOR:   Neil Valadez  Interpretating Date/Time  02/27/2018 22:40:37

## 2018-02-28 VITALS — HEART RATE: 62 BPM

## 2018-02-28 VITALS
TEMPERATURE: 97.8 F | HEART RATE: 73 BPM | DIASTOLIC BLOOD PRESSURE: 70 MMHG | SYSTOLIC BLOOD PRESSURE: 121 MMHG | OXYGEN SATURATION: 98 % | RESPIRATION RATE: 18 BRPM

## 2018-02-28 VITALS
TEMPERATURE: 97.2 F | HEART RATE: 64 BPM | RESPIRATION RATE: 18 BRPM | SYSTOLIC BLOOD PRESSURE: 113 MMHG | DIASTOLIC BLOOD PRESSURE: 54 MMHG | OXYGEN SATURATION: 96 %

## 2018-02-28 VITALS
DIASTOLIC BLOOD PRESSURE: 52 MMHG | TEMPERATURE: 97.8 F | RESPIRATION RATE: 20 BRPM | SYSTOLIC BLOOD PRESSURE: 107 MMHG | HEART RATE: 61 BPM | OXYGEN SATURATION: 97 %

## 2018-02-28 VITALS
OXYGEN SATURATION: 96 % | TEMPERATURE: 98 F | DIASTOLIC BLOOD PRESSURE: 71 MMHG | HEART RATE: 60 BPM | SYSTOLIC BLOOD PRESSURE: 111 MMHG | RESPIRATION RATE: 18 BRPM

## 2018-02-28 VITALS — HEART RATE: 70 BPM

## 2018-02-28 VITALS — HEART RATE: 68 BPM

## 2018-02-28 VITALS — HEART RATE: 60 BPM

## 2018-02-28 VITALS — HEART RATE: 66 BPM

## 2018-02-28 LAB
BASOPHILS # BLD AUTO: 0 TH/MM3 (ref 0–0.2)
BASOPHILS NFR BLD: 0.2 % (ref 0–2)
BUN SERPL-MCNC: 11 MG/DL (ref 7–18)
CALCIUM SERPL-MCNC: 8.6 MG/DL (ref 8.5–10.1)
CHLORIDE SERPL-SCNC: 106 MEQ/L (ref 98–107)
CHOLEST SERPL-MCNC: 157 MG/DL (ref 120–200)
CHOLESTEROL/ HDL RATIO: 4.26 RATIO
CREAT SERPL-MCNC: 0.82 MG/DL (ref 0.6–1.3)
EOSINOPHIL # BLD: 0.2 TH/MM3 (ref 0–0.4)
EOSINOPHIL NFR BLD: 1.9 % (ref 0–4)
ERYTHROCYTE [DISTWIDTH] IN BLOOD BY AUTOMATED COUNT: 12.5 % (ref 11.6–17.2)
GFR SERPLBLD BASED ON 1.73 SQ M-ARVRAT: 93 ML/MIN (ref 89–?)
GLUCOSE SERPL-MCNC: 87 MG/DL (ref 74–106)
HCO3 BLD-SCNC: 27.5 MEQ/L (ref 21–32)
HCT VFR BLD CALC: 39.7 % (ref 39–51)
HDLC SERPL-MCNC: 36.8 MG/DL (ref 40–60)
HGB BLD-MCNC: 13.7 GM/DL (ref 13–17)
LDLC SERPL-MCNC: 90 MG/DL (ref 0–99)
LYMPHOCYTES # BLD AUTO: 5 TH/MM3 (ref 1–4.8)
LYMPHOCYTES NFR BLD AUTO: 42.2 % (ref 9–44)
MCH RBC QN AUTO: 33.5 PG (ref 27–34)
MCHC RBC AUTO-ENTMCNC: 34.5 % (ref 32–36)
MCV RBC AUTO: 97.1 FL (ref 80–100)
MONOCYTE #: 0.8 TH/MM3 (ref 0–0.9)
MONOCYTES NFR BLD: 7.1 % (ref 0–8)
NEUTROPHILS # BLD AUTO: 5.7 TH/MM3 (ref 1.8–7.7)
NEUTROPHILS NFR BLD AUTO: 48.6 % (ref 16–70)
PLATELET # BLD: 202 TH/MM3 (ref 150–450)
PMV BLD AUTO: 7.9 FL (ref 7–11)
RBC # BLD AUTO: 4.09 MIL/MM3 (ref 4.5–5.9)
SODIUM SERPL-SCNC: 141 MEQ/L (ref 136–145)
TRIGL SERPL-MCNC: 150 MG/DL (ref 42–150)
WBC # BLD AUTO: 11.8 TH/MM3 (ref 4–11)

## 2018-02-28 RX ADMIN — TICAGRELOR SCH MG: 90 TABLET ORAL at 10:19

## 2018-02-28 RX ADMIN — DILTIAZEM HYDROCHLORIDE SCH MG: 30 TABLET, FILM COATED ORAL at 00:01

## 2018-02-28 RX ADMIN — DILTIAZEM HYDROCHLORIDE SCH MG: 30 TABLET, FILM COATED ORAL at 04:32

## 2018-02-28 NOTE — EKG
Date Performed: 02/28/2018       Time Performed: 06:13:34

 

PTAGE:      67 years

 

EKG:      Sinus rhythm 

 

 Inferior/lateral ST-T changes are nonspecific Low QRS voltages in limb leads Borderline ECG

 

PREVIOUS TRACING       : 02/27/2018 10.06 Since the prior tracing, there has been no significant cobb


 

DOCTOR:   Stuart Sanchez  Interpretating Date/Time  02/28/2018 12:30:31

## 2018-02-28 NOTE — PD.CARD.PN
Subjective


Subjective Remarks


no complaints. No angina





Objective


Medications





Current Medications








 Medications


  (Trade)  Dose


 Ordered  Sig/Delvin


 Route  Start Time


 Stop Time Status Last Admin


 


  (NS Flush)  2 ml  UNSCH  PRN


 IV FLUSH  2/26/18 19:45


     


 


 


  (NS Flush)  2 ml  BID


 IV FLUSH  2/26/18 21:00


    2/27/18 20:44


 


 


  (Tylenol)  650 mg  Q4H  PRN


 PO  2/26/18 19:45


    2/28/18 04:32


 


 


  (Zofran Inj)  4 mg  Q6H  PRN


 IVP  2/26/18 19:45


     


 


 


  (Narcan Inj)  0.4 mg  UNSCH  PRN


 IV PUSH  2/26/18 19:45


     


 


 


  (Morphine Inj)  4 mg  Q3H  PRN


 IV PUSH  2/26/18 22:00


    2/27/18 10:30


 


 


  (Symbicort


 80-4.5 Mcg Inh)  1 puff  Q12HR


 INH  2/27/18 09:00


    2/27/18 20:45


 


 


  (Tylenol)  325 mg  Q4H  PRN


 PO  2/27/18 10:30


     


 


 


  (Percocet  5-325


 Mg)  1 tab  Q4H  PRN


 PO  2/27/18 10:30


     


 


 


  (Restoril)  15 mg  HS  PRN


 PO  2/27/18 21:00


    2/27/18 22:03


 


 


  (Zofran Inj)  4 mg  Q6H  PRN


 IVP  2/27/18 10:30


     


 


 


  (Aspirin Chew)  81 mg  DAILY


 PO  2/28/18 09:00


    2/28/18 10:20


 


 


  (Brilinta)  90 mg  BID


 PO  2/27/18 21:00


    2/28/18 10:19


 


 


  (Lipitor)  40 mg  DAILY


 PO  2/28/18 09:00


    2/28/18 10:20


 


 


  (Cardizem)  30 mg  Q6HR


 PO  2/27/18 12:00


    2/28/18 04:32


 


 


  (Imdur)  60 mg  DAILY@07


 PO  2/28/18 07:00


    2/28/18 05:16


 


 


  (Duoneb Neb)  1 ampule  Q4HR NEB  PRN


 NEB  2/27/18 17:15


     


 








Vital Signs / I&O





Vital Signs








  Date Time  Temp Pulse Resp B/P (MAP) Pulse Ox O2 Delivery O2 Flow Rate FiO2


 


2/28/18 07:00 97.8 61 20 107/52 (70) 97   


 


2/28/18 06:00  62      


 


2/28/18 05:32   20     


 


2/28/18 05:00  62      


 


2/28/18 04:00 98.0 61 18 111/71 (84) 96   


 


2/28/18 04:00  60      


 


2/28/18 04:00     96 Room Air  


 


2/28/18 03:00  60      


 


2/28/18 02:00  62      


 


2/28/18 01:00  66      


 


2/28/18 00:00 97.8 76 18 121/70 (87) 98   


 


2/28/18 00:00  73      


 


2/28/18 00:00     98 Room Air  


 


2/27/18 23:00  66      


 


2/27/18 22:00  62      


 


2/27/18 21:00  58      


 


2/27/18 20:00     96 Room Air  


 


2/27/18 20:00 97.6 55 18 127/8 (47) 96   


 


2/27/18 20:00  61      


 


2/27/18 19:00  62      


 


2/27/18 18:00  56      


 


2/27/18 17:00  64      


 


2/27/18 16:00  64      


 


2/27/18 15:30 97.3 88 19 119/77 (91) 97   


 


2/27/18 15:00  62      


 


2/27/18 14:00  62      


 


2/27/18 13:09  58  132/75 (94) 96   


 


2/27/18 13:06     97 Room Air  


 


2/27/18 13:00  60      














I/O      


 


 2/27/18 2/27/18 2/27/18 2/28/18 2/28/18 2/28/18





 07:00 15:00 23:00 07:00 15:00 23:00


 


Intake Total 95 ml  720 ml 480 ml  


 


Balance 95 ml  720 ml 480 ml  


 


      


 


Intake Oral   720 ml 480 ml  


 


IV Total 95 ml     


 


# Voids 1  0 4  


 


# Bowel Movements    0  








Physical Exam


Alert


Chest decreased BS


CV S1S2 RRR


Abd soft


Ext no CCE


Right wrist OK, good pulse


Ekg NSSTWchanges


Laboratory





Laboratory Tests








Test


  2/27/18


14:18 2/28/18


04:26


 


Activated Partial


Thromboplast Time 23.8 SEC 


  


 


 


White Blood Count  11.8 TH/MM3 


 


Red Blood Count  4.09 MIL/MM3 


 


Hemoglobin  13.7 GM/DL 


 


Hematocrit  39.7 % 


 


Mean Corpuscular Volume  97.1 FL 


 


Mean Corpuscular Hemoglobin  33.5 PG 


 


Mean Corpuscular Hemoglobin


Concent 


  34.5 % 


 


 


Red Cell Distribution Width  12.5 % 


 


Platelet Count  202 TH/MM3 


 


Mean Platelet Volume  7.9 FL 


 


Neutrophils (%) (Auto)  48.6 % 


 


Lymphocytes (%) (Auto)  42.2 % 


 


Monocytes (%) (Auto)  7.1 % 


 


Eosinophils (%) (Auto)  1.9 % 


 


Basophils (%) (Auto)  0.2 % 


 


Neutrophils # (Auto)  5.7 TH/MM3 


 


Lymphocytes # (Auto)  5.0 TH/MM3 


 


Monocytes # (Auto)  0.8 TH/MM3 


 


Eosinophils # (Auto)  0.2 TH/MM3 


 


Basophils # (Auto)  0.0 TH/MM3 


 


CBC Comment  DIFF FINAL 


 


Differential Comment   


 


Blood Urea Nitrogen  11 MG/DL 


 


Creatinine  0.82 MG/DL 


 


Random Glucose  87 MG/DL 


 


Calcium Level  8.6 MG/DL 


 


Sodium Level  141 MEQ/L 


 


Potassium Level  3.5 MEQ/L 


 


Chloride Level  106 MEQ/L 


 


Carbon Dioxide Level  27.5 MEQ/L 


 


Anion Gap  8 MEQ/L 


 


Estimat Glomerular Filtration


Rate 


  93 ML/MIN 


 


 


Total Creatine Kinase  82 U/L 


 


Triglycerides Level  150 MG/DL 


 


Cholesterol Level  157 MG/DL 


 


LDL Cholesterol  90 MG/DL 


 


HDL Cholesterol  36.8 MG/DL 


 


Cholesterol/HDL Ratio  4.26 RATIO 











Assessment and Plan


Problem List:  


(1) Hyperlipidemia


ICD Codes:  E78.5 - Hyperlipidemia, unspecified


Plan:  H/O statin problems. Use atorva 10mg





(2) Stented coronary artery


ICD Codes:  Z95.5 - Presence of coronary angioplasty implant and graft


Plan:  Continue ASA 81mg and Brilinta 90mg bid





(3) 3-vessel coronary artery disease


ICD Codes:  I25.10 - Atherosclerotic heart disease of native coronary artery 

without angina pectoris


Plan:  No BB because of asthma. Continue Imdur 60, Dilt 120





(4) Non-ST elevation MI (NSTEMI)


ICD Codes:  I21.4 - Non-ST elevation (NSTEMI) myocardial infarction


Status:  Acute


(5) Asthma


ICD Codes:  J45.909 - Unspecified asthma, uncomplicated


(6) Obesity


ICD Codes:  E66.9 - Obesity, unspecified


Plan:  Counseled on diet





Assessment and Plan


I extensively educated him. No strenous activity. F/U with cardiologist back 

home











Humza Gutiérrez MD Feb 28, 2018 10:28

## 2018-02-28 NOTE — HHI.DS
__________________________________________________





Discharge Summary


Admission Date


Feb 26, 2018 at 19:27


Discharge Date:  Feb 28, 2018


Admitting Diagnosis





NSTEMI





(1) Non-ST elevation MI (NSTEMI)


ICD Code:  I21.4 - Non-ST elevation (NSTEMI) myocardial infarction


Status:  Acute


Procedures


Cardiac catheterization 2/27/2018.





CORONARY ANGIOGRAPHY:


   1.  Left main coronary artery is short and normal-appearing.  It 


      bifurcates into a large LAD and large circumflex vessel.


   2. The LAD gives off the first diagonal branch and a few 


      millimeters after that the first septal  branch and 


      then continuation of the LAD.  The LAD between the septal and 


      diagonal has about 40% stenosis.  The diagonal branch has about 


      a 70% mid stenosis where it bifurcates.  The remainder of the 


      LAD has irregularities only.


   3. The circumflex artery has a long stenosis beginning proximally 


      with a 99% stenosis just before the major obtuse marginal branch


      and before the previous stent that was in the mid circumflex 


      vessel.


   4. The posterolateral branch has about 30% disease and is a large 


      vessel.


   5. The obtuse marginal branch is medium sized with 50% ostial and 


      proximal disease.


   6. The right coronary artery is dominant with 50%-60% proximal and 


      mid disease.


 


RESULTS OF STENTING:


Finally stenting of the circumflex artery proper, a 0% residual 


stenosis had been achieved throughout the circumflex.  The major 


obtuse marginal branch has at least 50% ostial disease and proximal 


disease, but was not intervened upon.


 


PLAN:


The patient is loaded with Brilinta and will continue this for 30 days


with a free coupon and hopefully continue this medication long-term.  


If Brilinta needs to be interrupted or switched to Plavix, I would 


recommend giving a 600 mg Plavix dose 24 hours after the last dose of 


Brilinta, but I would prefer he stay on Brilinta for the next year.


Brief History - From Admission


67-year-old male with past medical history significant for asthma, CAD and 

hyperlipidemia presents to the emergency department for evaluation of chest 

pain.  Per the patient, his chest pain started around 4 PM and was 10/10 

substernal and nonradiating.  He describes the pain as a pressure with 

accompanying shortness of breath.  He denies any diaphoresis.  No fever/chills.

  No nausea/vomiting/diarrhea.  The patient had similar symptoms in [place] 

[place] where he is from a week ago where cardiac workup was negative.  He was 

instructed to follow-up with the cardiologist which she has not yet been able 

to do.  He is in [facility] on vacation.


CBC/BMP:  


2/28/18 0426                                                                   

             2/28/18 0426





Significant Findings





Laboratory Tests








Test


  2/26/18


17:30 2/26/18


20:20 2/26/18


23:50 2/27/18


02:29


 


Red Blood Count


  4.21 MIL/MM3


(4.50-5.90) 4.08 MIL/MM3


(4.50-5.90) 


  


 


 


Blood Urea Nitrogen


  20 MG/DL


(7-18) 


  


  


 


 


Random Glucose


  147 MG/DL


() 


  


  


 


 


Estimat Glomerular Filtration


Rate 77 ML/MIN


(>89) 


  


  


 


 


Troponin I


  0.12 NG/ML


(0.02-0.05) 


  0.28 NG/ML


(0.02-0.05) 


 


 


Activated Partial


Thromboplast Time 


  23.4 SEC


(24.3-30.1) 


  37.6 SEC


(24.3-30.1)


 


Test


  2/27/18


06:00 2/27/18


14:18 2/28/18


04:26 


 


 


White Blood Count


  12.1 TH/MM3


(4.0-11.0) 


  11.8 TH/MM3


(4.0-11.0) 


 


 


Red Blood Count


  4.12 MIL/MM3


(4.50-5.90) 


  4.09 MIL/MM3


(4.50-5.90) 


 


 


Lymphocytes (%) (Auto)


  51.9 %


(9.0-44.0) 


  


  


 


 


Lymphocytes # (Auto)


  6.3 TH/MM3


(1.0-4.8) 


  5.0 TH/MM3


(1.0-4.8) 


 


 


Lymphocytes % 45 % (9-44)    


 


Total Protein


  6.2 GM/DL


(6.4-8.2) 


  


  


 


 


Albumin


  3.1 GM/DL


(3.4-5.0) 


  


  


 


 


Calcium Level


  8.3 MG/DL


(8.5-10.1) 


  


  


 


 


Troponin I


  0.36 NG/ML


(0.02-0.05) 


  


  


 


 


Activated Partial


Thromboplast Time 


  23.8 SEC


(24.3-30.1) 


  


 


 


HDL Cholesterol


  


  


  36.8 MG/DL


(40.0-60.0) 


 








Imaging





Last Impressions








Chest X-Ray 2/26/18 1706 Signed





Impressions: 





 Service Date/Time:  Monday, February 26, 2018 17:25 - CONCLUSION:  Mildly 





 underinflated examination. No acute cardiopulmonary abnormality is identified.

   





   Andrae Henry MD 








PE at Discharge


GENERAL: Alert, oriented 3, NAD.


SKIN: Warm and dry.


HEAD: Normocephalic.


EYES: No scleral icterus. No injection or drainage. 


NECK: Supple, trachea midline. No JVD.


CARDIOVASCULAR: Regular rate and rhythm without murmurs, gallops, or rubs. 


RESPIRATORY: Breath sounds equal bilaterally. No accessory muscle use.


GASTROINTESTINAL: Abdomen soft, non-tender, nondistended. 


MUSCULOSKELETAL: No cyanosis, or edema. 


BACK: Nontender without obvious deformity. No CVA tenderness.





Pt update on day of discharge


feeling all right.  Feels comfortable going home.  Denies any chest pain or 

shortness of breath currently.


Hospital Course


Patient was found to have troponin elevation up to 0.36 with nonspecific 

changes on EKG.  Cardiology was consulted, patient underwent cardiac 

catheterization with stenting.  Please see report.  Patient was started on 

aspirin, ticagrelor, diltiazem as per cardiology.  Chest pain resolved.  Follow-

up with cardiology as outpatient.





For problem based summary from most recent progress note, please see below.





Mr. Ghosh is a 67-year-old  male with a history of coronary artery 

disease who presented to the emergency department on 2/26/2018 with substernal 

chest pain.  Patient was found to have non-STEMI.





//Non-STEMI


   -Patient underwent cardiac catheterization today.  Stent placed to the 

circumflex artery.


   -Continue aspirin 81 mg daily, regular 90 mg twice daily.


   -Continue atorvastatin 40 mg daily


   -Continue Imdur 60 mg daily and diltiazem 30 mg every 6 hours.


= Continue Imdur, aspirin, ticagrelor, diltiazem 120 mg daily.  Follow-up with 

cardiology as outpatient.





//Asthma


   -Patient takes Symbicort at home.  Will continue Symbicort.


   -Start DuoNeb every 4 hours as needed.


= Respiratory status stable.





Full code.  SCDs.  Telemetry.


Pt Condition on Discharge:  Good


Discharge Disposition:  Discharge Home


Discharge Time:  > 30 minutes


Discharge Instructions


DIET: Follow Instructions for:  Heart Healthy Diet


Activities you can perform:  Regular-No Restrictions


Follow up Referrals:  


Cardiology @ [facility] with Humza Gutiérrez MD


PCP Follow-up - 1 Week





New Medications:  


Aspirin (Tgt Aspirin) 81 Mg Chw


81 MG PO DAILY for heart, #30 EA





Diltiazem CD 24 HR (Diltiazem CD 24 HR) 120 Mg Caper


120 MG PO DAILY for heart, #30 CAP





Isosorbide Mononitrate ER (Isosorbide Mononitrate ER) 60 Mg Tab


60 MG PO DAILY@07 for heart for 30 Days, TAB





Ticagrelor (Brilinta) 90 Mg Tab


90 MG PO BID for heart for 30 Days, #60 TAB





 


Continued Medications:  


Budesonide-Formoterol Inh (Symbicort Inh) 80-4.5 Mcg/Act Aero


1 PUFF INH Q12HR for Asthma Management, #1 INHALER 0 Refills





 


Discontinued Medications:  


Prednisone (Prednisone) 20 Mg Tab


20 MG PO AS DIRECTED for Inflammation, #11 TAB 0 Refills


40 MG twice a day x 3 days, then 20 MG daily x 3 days, then 10 MG


 daily x 3 days














Anant Barrera MD Feb 28, 2018 11:35